# Patient Record
Sex: FEMALE | Race: WHITE | Employment: PART TIME | URBAN - NONMETROPOLITAN AREA
[De-identification: names, ages, dates, MRNs, and addresses within clinical notes are randomized per-mention and may not be internally consistent; named-entity substitution may affect disease eponyms.]

---

## 2020-08-30 ENCOUNTER — HOSPITAL ENCOUNTER (EMERGENCY)
Facility: HOSPITAL | Age: 40
Discharge: HOME | End: 2020-08-30
Attending: NURSE PRACTITIONER | Admitting: NURSE PRACTITIONER
Payer: COMMERCIAL

## 2020-08-30 VITALS
DIASTOLIC BLOOD PRESSURE: 88 MMHG | HEART RATE: 82 BPM | SYSTOLIC BLOOD PRESSURE: 134 MMHG | OXYGEN SATURATION: 99 % | TEMPERATURE: 98.5 F | RESPIRATION RATE: 18 BRPM

## 2020-08-30 DIAGNOSIS — K08.89 PAIN, DENTAL: Primary | ICD-10-CM

## 2020-08-30 DIAGNOSIS — K04.7 DENTAL ABSCESS: ICD-10-CM

## 2020-08-30 PROCEDURE — 40000268 ZZH STATISTIC NO CHARGES

## 2020-08-30 PROCEDURE — 25000128 H RX IP 250 OP 636: Performed by: NURSE PRACTITIONER

## 2020-08-30 PROCEDURE — 64400 NJX AA&/STRD TRIGEMINAL NRV: CPT

## 2020-08-30 PROCEDURE — 64400 NJX AA&/STRD TRIGEMINAL NRV: CPT | Mod: Z6 | Performed by: NURSE PRACTITIONER

## 2020-08-30 PROCEDURE — 96372 THER/PROPH/DIAG INJ SC/IM: CPT

## 2020-08-30 RX ORDER — KETOROLAC TROMETHAMINE 30 MG/ML
30 INJECTION, SOLUTION INTRAMUSCULAR; INTRAVENOUS ONCE
Status: COMPLETED | OUTPATIENT
Start: 2020-08-30 | End: 2020-08-30

## 2020-08-30 RX ADMIN — KETOROLAC TROMETHAMINE 30 MG: 30 INJECTION, SOLUTION INTRAMUSCULAR at 19:13

## 2020-08-30 ASSESSMENT — ENCOUNTER SYMPTOMS
FACIAL SWELLING: 1
TROUBLE SWALLOWING: 0

## 2020-08-30 NOTE — ED TRIAGE NOTES
Patients presents with dental pain. States she has a tooth that needs a root canal, she got prescribed amoxicillin and tylenol 3 today that she took the ibuprofen at 3 and the amoxicillin at 4. States that it is her right side bottom tooth and that area of her face is swelling. Pain is a constant ache.

## 2020-08-30 NOTE — ED AVS SNAPSHOT
[facility]  [address] [address]  Phone:  [phone]                                    Kulwinder Alex   MRN: [id number]    Department:  [facility]   Date of Visit:  8/30/2020           After Visit Summary Signature Page    I have received my discharge instructions, and my questions have been answered. I have discussed any challenges I see with this plan with the nurse or doctor.    ..........................................................................................................................................  Patient/Patient Representative Signature      ..........................................................................................................................................  Patient Representative Print Name and Relationship to Patient    ..................................................               ................................................  Date                                   Time    ..........................................................................................................................................  Reviewed by Signature/Title    ...................................................              ..............................................  Date                                               Time          22EPIC Rev 08/18        01-Feb-2020 18:22:10

## 2020-08-30 NOTE — ED PROVIDER NOTES
History     Chief Complaint   Patient presents with     Dental Pain     HPI  Kulwinder Alex is a 39 year old female who presents ambulatory to  for dental pain.  She started having right lower dental pain on Friday night (8/28/2020). Pain and swelling has gradually worsened.  She called her dentist today and was prescribed amoxicillin and Tylenol #3.  She notes that she took an old prescription of her sons hydrocodone last night and this morning around 0300 with no improvement in pain.  She has been taking ibuprofen as instructed by her dentist and last took the Tylenol #3 around 1600 again with no improvement in her pain.  Patient states dentist is planning on evaluating her in the clinic tomorrow and patient may need a root canal.    Denies any fever, nausea, vomiting, chest pain or shortness of breath.  It does hurt to swallow.  She is able to swallow her own saliva.    Patient was diagnosed with COVID-19 infection on Monday (8/24/2020) and notes fever was her only symptom.    Allergies:  No Known Allergies    Problem List:    Patient Active Problem List    Diagnosis Date Noted     Postoperative pain 06/15/2014     Priority: Medium     with vomiting       Viral gastroenteritis 06/15/2014     Priority: Medium     S/P hysterectomy 05/21/2014     Priority: Medium     Ovarian cyst 05/20/2014     Priority: Medium     Dermoid 9 cm right  Problem list name updated by automated process. Provider to review       Excessive or frequent menstruation 03/04/2014     Priority: Medium     Dysmenorrhea 03/04/2014     Priority: Medium        Past Medical History:    Past Medical History:   Diagnosis Date     Tachycardia        Past Surgical History:    Past Surgical History:   Procedure Laterality Date     ABDOMEN SURGERY      C/S 2010 and tubal ligation     ABDOMEN SURGERY      C/S 2007     cesection      2 past c. sections     GYN SURGERY      tubal ligation with c/s 2010     HYSTERECTOMY TOTAL ABDOMINAL, BILATERAL  SALPINGO-OOPHORECTOMY, COMBINED  5/21/2014    Procedure: COMBINED HYSTERECTOMY TOTAL ABDOMINAL, SALPINGO-OOPHORECTOMY;  Surgeon: Zeb Brown MD;  Location: [facility]     TUBAL LIGATION         Family History:    Family History   Problem Relation Age of Onset     Cancer Mother 50        lung     Hypertension Mother      Alcohol/Drug Mother      Diabetes Maternal Grandmother      Diabetes Maternal Grandfather      Diabetes Maternal Aunt      Diabetes Maternal Uncle        Social History:  Marital Status:   [2]  Social History     Tobacco Use     Smoking status: Never Smoker     Smokeless tobacco: Never Used   Substance Use Topics     Alcohol use: No     Drug use: No        Medications:    acetaminophen-codeine (TYLENOL #3) 300-30 MG tablet  DOCUSATE SODIUM PO          Review of Systems   HENT: Positive for dental problem and facial swelling. Negative for trouble swallowing.    All other systems reviewed and are negative.      Physical Exam   BP: 134/88  Pulse: 82  Temp: 98.5  F (36.9  C)  Resp: 18  SpO2: 99 %      Physical Exam  Vitals signs and nursing note reviewed.   Constitutional:       General: She is in acute distress.      Appearance: Normal appearance. She is not ill-appearing or toxic-appearing.   HENT:      Head: Normocephalic.      Jaw: Swelling (right) present. No trismus.      Comments: Mild swelling to right jaw.      Nose: Nose normal.      Mouth/Throat:      Lips: Pink.      Mouth: Mucous membranes are moist.      Dentition: Dental tenderness and gingival swelling present. No dental caries.      Pharynx: No oropharyngeal exudate.   Eyes:      Pupils: Pupils are equal, round, and reactive to light.   Neck:      Musculoskeletal: Neck supple.   Cardiovascular:      Rate and Rhythm: Normal rate and regular rhythm.      Heart sounds: Normal heart sounds.   Pulmonary:      Effort: Pulmonary effort is normal.      Breath sounds: Normal breath sounds.   Lymphadenopathy:      Cervical: No cervical  adenopathy.   Skin:     General: Skin is warm and dry.      Capillary Refill: Capillary refill takes less than 2 seconds.   Neurological:      Mental Status: She is alert and oriented to person, place, and time.         ED Course        [facility]    Dental Block    Date/Time: 8/30/2020 7:10 PM  Performed by: Breonna Cordoba CNP  Authorized by: Breonna Cordoba CNP       INDICATIONS     Indications: dental abscess and dental pain      LOCATION     Block type:  Inferior alveolar    Laterality:  Right    PROCEDURE DETAILS     Topical anesthetic:  Benzocaine gel    Syringe type:  Luer lock syringe    Needle gauge:  25 G    Anesthetic injected:  Bupivacaine 0.5% WITH epi    Injection procedure:  Anatomic landmarks identified, introduced needle, incremental injection, negative aspiration for blood and anatomic landmarks palpated    POST PROCEDURE DETAILS      Outcome:  Pain improved    PROCEDURE   Patient Tolerance:  Patient tolerated the procedure well with no immediate complications                     No results found for this or any previous visit (from the past 24 hour(s)).    Medications   ketorolac (TORADOL) injection 30 mg (30 mg Intramuscular Given 8/30/20 1913)       Assessments & Plan (with Medical Decision Making)   This is a 39 year old female that has a dental abscess and had an amoxicillin and tylenol #3 prescribed by dentist earlier today. Patient has tried OTC pain medications along with tylenol #3 with no improvement in pain.   She has mild swelling to right jaw along with gingival swelling. No trismus. Able to swallow own saliva with minimal difficulty. Vital signs stable. Last pain medications taken as follows:  Hydrocodone around 0300  Ibuprofen around 1400  Tylenol #3 about 2 hrs prior to this visit.    Dental block done with immediate improvement in pain. Patient still noted having dull pain to the affected tooth but pain had significantly improved and she felt much better. Toradol IM  given in urgent care as well.  -Avoid taking ibuprofen for 6hrs.   -Can continue tylenol #3 as prescribed.   -Do salt water rinses and apply cold compresses to affected side of face.     Patient notes she has an appointment with dentist tomorrow. Return to ED/UC for worsening or concerning symptoms. Patient verbalized understanding.     I have reviewed the nursing notes.    I have reviewed the findings, diagnosis, plan and need for follow up with the patient.    Final diagnoses:   Pain, dental   Dental abscess       8/30/2020   [facility]     Mpofu, Prudence, CNP  09/01/20 1582

## 2020-08-30 NOTE — ED TRIAGE NOTES
Patient presents today with complaints of dental pain. Does state she talked with a dentist on the phone got a script for tylenol 3 as well as an antibiotic which she states she took the first does of. States she's wanting something else for pain as she doesn't think the tylenol 3 is doing anything

## 2020-08-31 NOTE — DISCHARGE INSTRUCTIONS
Continue taking antibiotic and tylenol #3 as prescribed. Do salt water rinses. Apply cold compresses to the affected side of your face.     Keep scheduled appointment with your dentist.    Return to emergency department for worsening or concerning symptoms.

## 2021-02-28 ENCOUNTER — HEALTH MAINTENANCE LETTER (OUTPATIENT)
Age: 41
End: 2021-02-28

## 2021-10-03 ENCOUNTER — HEALTH MAINTENANCE LETTER (OUTPATIENT)
Age: 41
End: 2021-10-03

## 2021-11-01 ENCOUNTER — HOSPITAL ENCOUNTER (EMERGENCY)
Facility: HOSPITAL | Age: 41
Discharge: HOME | End: 2021-11-01
Attending: NURSE PRACTITIONER | Admitting: NURSE PRACTITIONER
Payer: COMMERCIAL

## 2021-11-01 VITALS
HEART RATE: 86 BPM | RESPIRATION RATE: 16 BRPM | OXYGEN SATURATION: 99 % | SYSTOLIC BLOOD PRESSURE: 132 MMHG | TEMPERATURE: 99.7 F | DIASTOLIC BLOOD PRESSURE: 84 MMHG

## 2021-11-01 DIAGNOSIS — K08.89 PAIN, DENTAL: ICD-10-CM

## 2021-11-01 PROCEDURE — 96372 THER/PROPH/DIAG INJ SC/IM: CPT | Performed by: NURSE PRACTITIONER

## 2021-11-01 PROCEDURE — 99213 OFFICE O/P EST LOW 20 MIN: CPT | Performed by: NURSE PRACTITIONER

## 2021-11-01 PROCEDURE — G0463 HOSPITAL OUTPT CLINIC VISIT: HCPCS | Mod: 25

## 2021-11-01 PROCEDURE — 250N000011 HC RX IP 250 OP 636: Performed by: NURSE PRACTITIONER

## 2021-11-01 RX ORDER — KETOROLAC TROMETHAMINE 30 MG/ML
60 INJECTION, SOLUTION INTRAMUSCULAR; INTRAVENOUS ONCE
Status: COMPLETED | OUTPATIENT
Start: 2021-11-01 | End: 2021-11-01

## 2021-11-01 RX ORDER — ONDANSETRON 4 MG/1
4 TABLET, ORALLY DISINTEGRATING ORAL ONCE
Status: COMPLETED | OUTPATIENT
Start: 2021-11-01 | End: 2021-11-01

## 2021-11-01 RX ADMIN — ONDANSETRON 4 MG: 4 TABLET, ORALLY DISINTEGRATING ORAL at 21:30

## 2021-11-01 RX ADMIN — KETOROLAC TROMETHAMINE 60 MG: 30 INJECTION, SOLUTION INTRAMUSCULAR at 21:09

## 2021-11-01 ASSESSMENT — ENCOUNTER SYMPTOMS
SINUS PAIN: 0
COUGH: 0
NAUSEA: 1
RHINORRHEA: 0
CHILLS: 0
FEVER: 0
ACTIVITY CHANGE: 1
EYES NEGATIVE: 1
HEADACHES: 1
SINUS PRESSURE: 0
VOMITING: 0
SHORTNESS OF BREATH: 0
FACIAL SWELLING: 1
NECK PAIN: 1

## 2021-11-02 NOTE — ED TRIAGE NOTES
Pt presents with c/o bottom right dental pain. Reports she needs a root canal and it is infected. Pt has a root canal scheduled for Wednesday. Pt has been using tylenol and ibuprofen for pain and states she just wants to sleep because neither one is helping with the pain.

## 2021-11-02 NOTE — DISCHARGE INSTRUCTIONS
Apply a cold pack or ice compress for up to 20 minutes several times a day. This is to help reduce pain and relieve swelling. Cover it with a thin, dry cloth before putting it on your skin.    Rinse your mouth with warm saltwater several times per day. This will help reduce irritation, gum swelling, and pain.  Good oral hygiene is imperitive. Brush your at least twice a day. Use a fluoride toothpaste and soft-bristle toothbrush.  Eat a healthy diet that doesn t include many sugary foods and drinks.    AFTER 5 am may start: Ibuprofen 600 to 800 mg (3 - 4 tabs of over the counter med) every six to eight hours as needed;not to exceed maximum amount of 3200 mg in 24 hours. Take with food. Tylenol 650 to 1000 mg every four to six hours as needed (not to exceed more than 4000 mg in a 24 hour period). May use interchangeably. Suggest medicating around the clock for the next 24-48 hours.   Keep scheduled dental appointment

## 2021-11-02 NOTE — ED TRIAGE NOTES
Patient in with dental pain, stating she does have a root canal scheduled, but states the pain is so bad she's unable to sleep and even with the meds there's limited relief. Wondering about a block

## 2021-11-02 NOTE — ED PROVIDER NOTES
History     Chief Complaint   Patient presents with     Dental Pain     HPI  Kulwinder Alex is a 41 year old female who is accompanied per her boyfriend.  She presents with a 4-day history of right lower dental pain.  Accompanied with right ear pain, facial swelling, nausea, neck pain, and headaches.  She saw her dentist today.  He started her on an antibiotic and prescribed acetaminophen with codeine.  This was not diminishing her discomfort so she called her PCP and was provided Ultram.  Last dose was 4 hours ago.  Was supposed to have a root canal performed 1 year ago on the same tooth that is bothering her. Had second dose of COVID vaccine 9/2021.   Denies fevers, chills, vomiting, and shortness of breath.    Allergies:  No Known Allergies    Problem List:    Patient Active Problem List    Diagnosis Date Noted     Postoperative pain 06/15/2014     Priority: Medium     with vomiting       Viral gastroenteritis 06/15/2014     Priority: Medium     S/P hysterectomy 05/21/2014     Priority: Medium     Ovarian cyst 05/20/2014     Priority: Medium     Dermoid 9 cm right  Problem list name updated by automated process. Provider to review       Excessive or frequent menstruation 03/04/2014     Priority: Medium     Dysmenorrhea 03/04/2014     Priority: Medium        Past Medical History:    Past Medical History:   Diagnosis Date     Tachycardia        Past Surgical History:    Past Surgical History:   Procedure Laterality Date     ABDOMEN SURGERY      C/S 2010 and tubal ligation     ABDOMEN SURGERY      C/S 2007     cesection      2 past c. sections     GYN SURGERY      tubal ligation with c/s 2010     HYSTERECTOMY TOTAL ABDOMINAL, BILATERAL SALPINGO-OOPHORECTOMY, COMBINED  5/21/2014    Procedure: COMBINED HYSTERECTOMY TOTAL ABDOMINAL, SALPINGO-OOPHORECTOMY;  Surgeon: Zeb Brown MD;  Location: [facility]     TUBAL LIGATION         Family History:    Family History   Problem Relation Age of Onset     Cancer Mother 50         lung     Hypertension Mother      Alcohol/Drug Mother      Diabetes Maternal Grandmother      Diabetes Maternal Grandfather      Diabetes Maternal Aunt      Diabetes Maternal Uncle        Social History:  Marital Status:   [2]  Social History     Tobacco Use     Smoking status: Never Smoker     Smokeless tobacco: Never Used   Substance Use Topics     Alcohol use: No     Drug use: No        Medications:    acetaminophen-codeine (TYLENOL #3) 300-30 MG tablet  DOCUSATE SODIUM PO          Review of Systems   Constitutional: Positive for activity change. Negative for chills and fever.   HENT: Positive for dental problem, ear pain and facial swelling (slight). Negative for rhinorrhea, sinus pressure and sinus pain.    Eyes: Negative.    Respiratory: Negative for cough and shortness of breath.    Gastrointestinal: Positive for nausea. Negative for vomiting.   Musculoskeletal: Positive for neck pain.   Skin: Negative.    Neurological: Positive for headaches (little bit).       Physical Exam   BP: (!) 153/101  Pulse: 86  Temp: 99.7  F (37.6  C)  Resp: 16  SpO2: 99 %      Physical Exam  Vitals and nursing note reviewed.   Constitutional:       General: She is in acute distress.      Appearance: She is overweight.   HENT:      Head: Normocephalic.      Jaw: There is normal jaw occlusion. Tenderness present.        Right Ear: Tympanic membrane and ear canal normal.      Mouth/Throat:      Lips: Pink.      Mouth: Mucous membranes are moist.      Dentition: Dental tenderness, dental caries and dental abscesses present.   Eyes:      Conjunctiva/sclera: Conjunctivae normal.   Cardiovascular:      Rate and Rhythm: Normal rate.   Pulmonary:      Effort: Pulmonary effort is normal.   Musculoskeletal:      Cervical back: Neck supple. No tenderness.   Lymphadenopathy:      Cervical: No cervical adenopathy.   Skin:     General: Skin is warm and dry.      Capillary Refill: Capillary refill takes less than 2 seconds.    Neurological:      Mental Status: She is alert and oriented to person, place, and time.   Psychiatric:         Behavior: Behavior normal.         ED Course        Procedures             No results found for this or any previous visit (from the past 24 hour(s)).    Medications   ketorolac (TORADOL) injection 60 mg (60 mg Intramuscular Given 11/1/21 2109)   ondansetron (ZOFRAN-ODT) ODT tab 4 mg (4 mg Oral Given 11/1/21 2130)       Assessments & Plan (with Medical Decision Making)     I have reviewed the nursing notes.    I have reviewed the findings, diagnosis, plan and need for follow up with the patient.  (K08.89) Pain, dental  Comment: 41 year old female who is accompanied per her boyfriend.  She presents with a 4-day history of right lower dental pain.  Accompanied with right ear pain, facial swelling, nausea, neck pain, and headaches.  She saw her dentist today.  He started her on an antibiotic and prescribed acetaminophen with codeine.  This was not diminishing her discomfort so she called her PCP and was provided Ultram.  Last dose was 4 hours ago.  Was supposed to have a root canal performed 1 year ago on the same tooth that is bothering her. Had second dose of COVID vaccine 9/2021.   Denies fevers, chills, vomiting, and shortness of breath.    MDM: cavity seen under dental cap of tooth # 30. Right lower cheek swollen.  Occlusion normal    Ketorolac 60 mg given IM.  Did get nauseous after injection and slightly shaky.  Zofran 4 mg ODT given.  Within 10 minutes was feeling better and her dental pain was resolving.    Plan: Apply a cold pack or ice compress for up to 20 minutes several times a day. This is to help reduce pain and relieve swelling. Cover it with a thin, dry cloth before putting it on your skin.    Rinse your mouth with warm saltwater several times per day. This will help reduce irritation, gum swelling, and pain.  Good oral hygiene is imperitive. Brush your at least twice a day. Use a fluoride  toothpaste and soft-bristle toothbrush.  Eat a healthy diet that doesn t include many sugary foods and drinks.    AFTER 5 am may start: Ibuprofen 600 to 800 mg (3 - 4 tabs of over the counter med) every six to eight hours as needed;not to exceed maximum amount of 3200 mg in 24 hours. Take with food. Tylenol 650 to 1000 mg every four to six hours as needed (not to exceed more than 4000 mg in a 24 hour period). May use interchangeably. Suggest medicating around the clock for the next 24-48 hours.   Keep scheduled dental appointment  These discharge instructions and medications were reviewed with her and her boyfriend and understanding verbalized.    This document was prepared using a combination of typing and voice generated software.  While every attempt was made for accuracy, spelling and grammatical errors may exist.    Discharge Medication List as of 11/1/2021  9:41 PM          Final diagnoses:   Pain, dental       11/1/2021   [facility]       Donya Johnson, CNP  11/01/21 3211

## 2022-03-19 ENCOUNTER — HEALTH MAINTENANCE LETTER (OUTPATIENT)
Age: 42
End: 2022-03-19

## 2022-09-04 ENCOUNTER — HEALTH MAINTENANCE LETTER (OUTPATIENT)
Age: 42
End: 2022-09-04

## 2023-04-29 ENCOUNTER — HEALTH MAINTENANCE LETTER (OUTPATIENT)
Age: 43
End: 2023-04-29

## 2024-02-18 ENCOUNTER — HEALTH MAINTENANCE LETTER (OUTPATIENT)
Age: 44
End: 2024-02-18

## 2024-07-07 ENCOUNTER — HEALTH MAINTENANCE LETTER (OUTPATIENT)
Age: 44
End: 2024-07-07